# Patient Record
Sex: MALE | Race: WHITE | NOT HISPANIC OR LATINO | Employment: STUDENT | URBAN - METROPOLITAN AREA
[De-identification: names, ages, dates, MRNs, and addresses within clinical notes are randomized per-mention and may not be internally consistent; named-entity substitution may affect disease eponyms.]

---

## 2024-02-21 ENCOUNTER — PATIENT ROUNDING (BHMG ONLY) (OUTPATIENT)
Dept: URGENT CARE | Facility: CLINIC | Age: 19
End: 2024-02-21
Payer: COMMERCIAL

## 2024-02-21 NOTE — ED NOTES
Thank you for letting us care for you in your recent visit to our urgent care center. We would love to hear about your experience with us. Was this the first time you have visited our location?    We’re always looking for ways to make our patients’ experiences even better. Do you have any recommendations on ways we may improve?     I appreciate you taking the time to respond. Please be on the lookout for a survey about your recent visit from [facility] via text or email. We would greatly appreciate if you could fill that out and turn it back in. We want your voice to be heard and we value your feedback.   Thank you for choosing [facility] for your healthcare needs.

## 2024-02-22 ENCOUNTER — OFFICE VISIT (OUTPATIENT)
Age: 19
End: 2024-02-22
Payer: COMMERCIAL

## 2024-02-22 VITALS
WEIGHT: 181.2 LBS | DIASTOLIC BLOOD PRESSURE: 70 MMHG | SYSTOLIC BLOOD PRESSURE: 118 MMHG | BODY MASS INDEX: 25.94 KG/M2 | HEIGHT: 70 IN

## 2024-02-22 DIAGNOSIS — M79.641 RIGHT HAND PAIN: ICD-10-CM

## 2024-02-22 DIAGNOSIS — M25.531 RIGHT WRIST PAIN: Primary | ICD-10-CM

## 2024-02-22 NOTE — PROGRESS NOTES
"                                                               [facility] Orthopedic     Office Visit       Date: 02/22/2024   Patient Name: Danyel Schmid  MRN: [id number]  YOB: 2005    Referring Physician: Keon Zamudio PA-C     Chief Complaint:   Chief Complaint   Patient presents with    Right Hand - Initial Evaluation     DOI:2/15/24       History of Present Illness:   Danyel Schmid is a 18 y.o. male hand dominant presents with right wrist pain of 1 week duration.  Patient reports he was boxing and felt immediate pain in his right wrist approximately 1 week ago.  He subsequently presented to urgent care had an x-ray which was negative for fracture.  He was placed in a right thumb spica brace and referred to hand surgery.  He reports that he has been only intermittently wearing the brace since his last visit.  Denies numbness or tingling.  Reports that his pain and swelling have improved since the injury.  He is otherwise healthy.  He denies smoking.      Subjective   Review of Systems:   Review of Systems   Constitutional: Negative.    HENT: Negative.     Eyes: Negative.    Respiratory: Negative.     Cardiovascular: Negative.    Gastrointestinal: Negative.    Endocrine: Negative.    Genitourinary: Negative.    Musculoskeletal:  Positive for arthralgias.   Skin: Negative.    Allergic/Immunologic: Negative.    Neurological: Negative.    Hematological: Negative.    Psychiatric/Behavioral: Negative.          Pertinent review of systems per HPI.     I reviewed the patient's chief complaint, history of present illness, review of systems, past medical history, surgical history, family history, social history, medications and allergy list in the EMR on 02/22/2024 and agree with the findings above.    Objective    Vital Signs:   Vitals:    02/22/24 1319   BP: 118/70   Weight: 82.2 kg (181 lb 3.2 oz)   Height: 176.5 cm (69.5\")     BMI: Pediatric BMI = 84 %ile (Z= 1.00) based on CDC (Boys, 2-20 Years) " BMI-for-age based on BMI available as of 2/22/2024.. BMI is >= 25 and <30. (Overweight) The following options were offered after discussion;: weight loss educational material (shared in after visit summary)       General Appearance: No acute distress. Alert and oriented.     Chest:  Non-labored breathing on room air. Regular rate and rhythm.    Right upper Extremity Exam:    No palpable masses or visible lesions  Fingers are warm, well-perfused with appropriate capillary refill.  Palpable radial pulse.    Sensation intact to light touch in median, radial and ulnar nerve distributions.    Motor- Fires FPL, ulnar intrinsics, EPL/EDC w/ full active and passive range of motion. Strength intact.    Non-tender except for in the areas highlighted below    Right wrist is mildly swollen.  Tender to palpation of the right dorsal SL.  Nontender over the volar scaphoid.  Nontender over the anatomic snuffbox.  Negative Persaud shift test.  Nontender over the DRUJ.  Nontender over the TFCC.  DRUJ is stable in pronation and supination.    Imaging/Studies:     X-ray of the right hand and wrist from 2/18/2024 was independently reviewed and interpreted by myself and demonstrate no evidence of bony abnormality    Bilateral wrist Fluoroscopy     Indication: Right wrist pain     Views:  Bilateral clenched fist views, dynamic x-rays     Comparison: Right hand x-ray     Findings:  No evidence of asymmetric SL widening on bilateral clenched fist view.  No dynamic SL widening with axial loading of the right wrist and ulnar and radial deviation        Impression: No evidence of static or dynamic SL widening of the right wrist    Procedures:  Procedures    Quality Measures:   ACP:   ACP discussion was declined by the patient, Patient does not have an advance directive, declines further assistance.    Tobacco:   Danyel Binu  reports that he has never smoked. He has never been exposed to tobacco smoke. He has never used smokeless  tobacco.      Assessment / Plan    Assessment/Plan:     There are no diagnoses linked to this encounter.     Danyel Cordero a 18 y.o. male who presents with:      ICD-10-CM ICD-9-CM   1. Right wrist pain  M25.531 719.43   2. Right hand pain  M79.641 729.5     Patient presents with right wrist pain of 1 week duration after boxing.  He does have tenderness over the dorsal SL but is wrist is stable with a negative Persaud shift test.  He has no evidence of static or dynamic SL instability on fluoroscopy.  Recommend right wrist brace for likely wrist sprain.  Follow-up in 6 weeks for repeat exam.  If patient continues to have pain we will consider MRI at that time.    Follow Up:   Return in about 6 weeks (around 4/4/2024).        Philippe Lyman MD  [facility] Hand and Upper Extremity Surgeon

## 2025-02-28 ENCOUNTER — PATIENT ROUNDING (BHMG ONLY) (OUTPATIENT)
Dept: URGENT CARE | Facility: CLINIC | Age: 20
End: 2025-02-28
Payer: COMMERCIAL